# Patient Record
Sex: FEMALE | Race: WHITE | NOT HISPANIC OR LATINO | Employment: OTHER | URBAN - METROPOLITAN AREA
[De-identification: names, ages, dates, MRNs, and addresses within clinical notes are randomized per-mention and may not be internally consistent; named-entity substitution may affect disease eponyms.]

---

## 2019-10-29 ENCOUNTER — LAB VISIT (OUTPATIENT)
Dept: LAB | Facility: HOSPITAL | Age: 79
End: 2019-10-29
Attending: INTERNAL MEDICINE
Payer: MEDICARE

## 2019-10-29 ENCOUNTER — OFFICE VISIT (OUTPATIENT)
Dept: PULMONOLOGY | Facility: CLINIC | Age: 79
End: 2019-10-29
Payer: MEDICARE

## 2019-10-29 VITALS
WEIGHT: 143.75 LBS | RESPIRATION RATE: 19 BRPM | BODY MASS INDEX: 25.47 KG/M2 | HEART RATE: 60 BPM | HEIGHT: 63 IN | DIASTOLIC BLOOD PRESSURE: 80 MMHG | SYSTOLIC BLOOD PRESSURE: 142 MMHG | OXYGEN SATURATION: 96 %

## 2019-10-29 DIAGNOSIS — R06.89 DYSPNEA AND RESPIRATORY ABNORMALITIES: ICD-10-CM

## 2019-10-29 DIAGNOSIS — R06.00 DYSPNEA AND RESPIRATORY ABNORMALITIES: ICD-10-CM

## 2019-10-29 DIAGNOSIS — R06.89 DYSPNEA AND RESPIRATORY ABNORMALITIES: Primary | ICD-10-CM

## 2019-10-29 DIAGNOSIS — R06.00 DYSPNEA AND RESPIRATORY ABNORMALITIES: Primary | ICD-10-CM

## 2019-10-29 LAB
CRP SERPL-MCNC: 16.4 MG/L (ref 0–8.2)
ERYTHROCYTE [SEDIMENTATION RATE] IN BLOOD BY WESTERGREN METHOD: 16 MM/HR (ref 0–20)

## 2019-10-29 PROCEDURE — 99205 OFFICE O/P NEW HI 60 MIN: CPT | Mod: S$GLB,,, | Performed by: INTERNAL MEDICINE

## 2019-10-29 PROCEDURE — 86038 ANTINUCLEAR ANTIBODIES: CPT

## 2019-10-29 PROCEDURE — 86255 FLUORESCENT ANTIBODY SCREEN: CPT | Mod: 91

## 2019-10-29 PROCEDURE — 1101F PR PT FALLS ASSESS DOC 0-1 FALLS W/OUT INJ PAST YR: ICD-10-PCS | Mod: S$GLB,,, | Performed by: INTERNAL MEDICINE

## 2019-10-29 PROCEDURE — 99205 PR OFFICE/OUTPT VISIT, NEW, LEVL V, 60-74 MIN: ICD-10-PCS | Mod: S$GLB,,, | Performed by: INTERNAL MEDICINE

## 2019-10-29 PROCEDURE — 99999 PR PBB SHADOW E&M-NEW PATIENT-LVL III: CPT | Mod: PBBFAC,,, | Performed by: INTERNAL MEDICINE

## 2019-10-29 PROCEDURE — 99999 PR PBB SHADOW E&M-NEW PATIENT-LVL III: ICD-10-PCS | Mod: PBBFAC,,, | Performed by: INTERNAL MEDICINE

## 2019-10-29 PROCEDURE — 1101F PT FALLS ASSESS-DOCD LE1/YR: CPT | Mod: S$GLB,,, | Performed by: INTERNAL MEDICINE

## 2019-10-29 PROCEDURE — 85651 RBC SED RATE NONAUTOMATED: CPT

## 2019-10-29 PROCEDURE — 86140 C-REACTIVE PROTEIN: CPT

## 2019-10-29 RX ORDER — AMLODIPINE BESYLATE 5 MG/1
5 TABLET ORAL
COMMUNITY
Start: 2015-07-24

## 2019-10-29 RX ORDER — CYCLOSPORINE 0.5 MG/ML
EMULSION OPHTHALMIC
COMMUNITY
Start: 2017-03-09

## 2019-10-29 RX ORDER — LEVOTHYROXINE SODIUM 88 UG/1
88 TABLET ORAL
COMMUNITY
Start: 2019-06-14

## 2019-10-29 RX ORDER — UBIDECARENONE 30 MG
30 CAPSULE ORAL
COMMUNITY

## 2019-10-29 RX ORDER — FLECAINIDE ACETATE 50 MG/1
TABLET ORAL
Refills: 3 | COMMUNITY
Start: 2019-10-04

## 2019-10-29 RX ORDER — ASPIRIN 325 MG
325 TABLET ORAL
COMMUNITY

## 2019-10-29 RX ORDER — PREDNISONE 5 MG/1
TABLET ORAL
Refills: 1 | COMMUNITY
Start: 2019-10-13

## 2019-10-29 RX ORDER — ESTRADIOL 0.1 MG/G
CREAM VAGINAL
Refills: 11 | COMMUNITY
Start: 2019-09-29

## 2019-10-29 RX ORDER — METOPROLOL SUCCINATE 50 MG/1
50 TABLET, EXTENDED RELEASE ORAL
COMMUNITY
Start: 2016-08-05

## 2019-10-29 RX ORDER — AMLODIPINE BESYLATE 5 MG/1
TABLET ORAL
Refills: 3 | COMMUNITY
Start: 2019-09-03 | End: 2020-02-17 | Stop reason: SDUPTHER

## 2019-10-29 RX ORDER — ASPIRIN 325 MG
TABLET, DELAYED RELEASE (ENTERIC COATED) ORAL
COMMUNITY

## 2019-10-29 RX ORDER — BUTYROSPERMUM PARKII(SHEA BUTTER), SIMMONDSIA CHINENSIS (JOJOBA) SEED OIL, ALOE BARBADENSIS LEAF EXTRACT .01; 1; 3.5 G/100G; G/100G; G/100G
LIQUID TOPICAL
COMMUNITY

## 2019-10-29 RX ORDER — PENTOSAN POLYSULFATE SODIUM 100 MG/1
CAPSULE, GELATIN COATED ORAL
Refills: 11 | COMMUNITY
Start: 2019-07-29

## 2019-10-29 RX ORDER — ESTRADIOL 1 MG/1
TABLET ORAL
Refills: 1 | COMMUNITY
Start: 2019-07-29

## 2019-10-29 RX ORDER — TRAMADOL HYDROCHLORIDE 50 MG/1
TABLET ORAL
COMMUNITY
Start: 2019-08-10

## 2019-10-29 RX ORDER — CLONAZEPAM 1 MG/1
TABLET ORAL
COMMUNITY
Start: 2019-08-01

## 2019-10-29 RX ORDER — NITROFURANTOIN MACROCRYSTALS 50 MG/1
50 CAPSULE ORAL
COMMUNITY

## 2019-10-29 RX ORDER — SIMETHICONE 125 MG
CAPSULE ORAL DAILY PRN
COMMUNITY

## 2019-10-29 NOTE — LETTER
October 29, 2019      Deonna Chua MD  [address] [facility] [address]           [facility]  [address] [place] [id number]  Phone: [phone]  Fax: [phone]          Patient: Thea Centeno   MR Number: [id number]   YOB: 1940   Date of Visit: 10/29/2019       Dear Dr. Deonna Chua:    Thank you for referring Thea Centeno to me for evaluation. Attached you will find relevant portions of my assessment and plan of care.    If you have questions, please do not hesitate to call me. I look forward to following Thea Centeno along with you.    Sincerely,    Manjeet Thomas MD    Enclosure  CC:  No Recipients    If you would like to receive this communication electronically, please contact [email or web address] or [phone] to request more information on [employer] access.    For providers and/or their staff who would like to refer a patient to [facility], please contact us through our one-stop-shop provider referral line, [facility] , at [phone].    If you feel you have received this communication in error or would no longer like to receive these types of communications, please e-mail [email or web address]

## 2019-10-29 NOTE — PROGRESS NOTES
"New patient    Subjective:      Patient ID: Thea Centeno is a 79 y.o. female.    Patient Active Problem List   Diagnosis    Dyspnea and respiratory abnormalities       Problem list has been reviewed.    she has been referred by Deonna Chua MD for evaluation and management for   Chief Complaint   Patient presents with    Wheezing       Chief Complaint: Wheezing      HPI:    " I have never heard any wheezing", " My friends and doctors tell me I have wheezing".      Patient reports occasional non productive cough and intermittent dyspnea with exertion and denies chest pain or discomfort. Patient denies recent sick contacts.   Patient does not have new pets. Patient does not have a personal or family  history of asthma. Patient does have a history of environmental allergens. Patient has not traveled recently. Patient does not have a history of smoking. Patient has had a previous chest x-ray. Patient has  had a PPD done.  PPD was Negative    Previous pulmonologist: Dr White - Previous spirometry suggestive of mild restrictive lung defect.- Documents suspicion of Asthma after improvement of symptoms with inhaled corticosteroids.     She is not currently y on any Inhaled therapies.       Previous Report Reviewed: historical medical records, office notes and radiology reports     Past Medical History: The following portions of the patient's history were reviewed and updated as appropriate:   She  has a past surgical history that includes bladder lift and Tonsillectomy (N/A).  Her family history is not on file.  She  reports that she has never smoked. She does not have any smokeless tobacco history on file. She reports that she does not drink alcohol or use drugs.  She has a current medication list which includes the following prescription(s): amlodipine, amlodipine, aspirin, biotin, clonazepam, clotrimazole, co-enzyme q-10, cyclosporine, elmiron, estradiol, estradiol, flecainide, levothyroxine, metoprolol " "succinate, multivitamin with minerals, nitrofurantoin, prednisone, saccharomyces boulardii, simethicone, and tramadol.  She is allergic to doxycycline; codeine; methocarbamol; ropinirole; and chlorhexidine gluconate..    Review of Systems   Constitutional: Positive for fatigue. Negative for fever, chills and night sweats.   HENT: Positive for rhinorrhea. Negative for nosebleeds, postnasal drip, sore throat, trouble swallowing, congestion and hearing loss.    Eyes: Negative for redness and itching.   Respiratory: Positive for cough and dyspnea on extertion. Negative for snoring, wheezing and use of rescue inhaler.    Cardiovascular: Positive for palpitations. Negative for chest pain and leg swelling.   Genitourinary: Negative for difficulty urinating and hematuria.   Endocrine: Negative for polydipsia, cold intolerance and heat intolerance.    Musculoskeletal: Positive for arthralgias and back pain.   Skin: Negative for rash.   Gastrointestinal: Positive for acid reflux. Negative for nausea, vomiting, abdominal pain and abdominal distention.        Diarrhea   Neurological: Negative for dizziness, light-headedness and headaches.   Hematological: Excessive bruising.   Psychiatric/Behavioral: The patient is nervous/anxious.    All other systems reviewed and are negative.         Occupational History:  Worked at [employer] for 23 years. She is currently retired.   [employer] occupational exposure to asbestos, silica and petrochemicals.    Avocational Exposures:  none    Pet Exposures:  none      Objective:     Vitals:    10/29/19 1138   BP: (!) 142/80   Pulse: 60   Resp: 19   SpO2: 96%   Weight: 65.2 kg (143 lb 11.8 oz)   Height: 5' 3" (1.6 m)     Body mass index is 25.46 kg/m².    Physical Exam   Constitutional: She is oriented to person, place, and time. She appears well-developed and well-nourished.   HENT:   Head: Normocephalic and atraumatic.   Mallampati 2   Eyes: Pupils are equal, round, and " "reactive to light. EOM are normal.   Neck: Normal range of motion. Neck supple.   11.5"   Cardiovascular: Normal rate and regular rhythm.   Pulmonary/Chest: Effort normal and breath sounds normal. No stridor. She has no wheezes. She has no rales.   Abdominal: Soft. Bowel sounds are normal.   Musculoskeletal: Normal range of motion.   Neurological: She is alert and oriented to person, place, and time.   Skin: Skin is warm and dry. Capillary refill takes less than 2 seconds.   Psychiatric: She has a normal mood and affect.   Nursing note and vitals reviewed.      Personal Diagnostic Review    PFT: 09/12/19: Complete pulmonary function studies were performed. Spirometry reveals a mild restrictive lung defect. Total lung capacity was reduced at 78% of predicted. Diffusion is also mildly reduced.     CXR: 09.05/19: Comparison study 1/17/2017. Stable mild cardiomegaly. Aortictortuosity. No congestion. Minimal scarring peripheral left lower chest. Otherwise, lungs are clear. Thoracic spondylosis. Status post kyphoplasty of a compressed mid thoracic vertebral body.    Assessment /Plan:     Discussed diagnosis, its evaluation, treatment and usual course. All questions answered.    Problem List Items Addressed This Visit        Other    Dyspnea and respiratory abnormalities - Primary    Current Assessment & Plan     Etiology unclear.  Multifactorial etiology suspected.  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  [x] Cardiac illess  []         Sleep disorder    EVALUATION:  []        Complete PFT with bronchodilator.  []        Bronchial challenge with methacholine.   [x]        Stress test, pulmonary.  [x]        PULM - Arterial Blood Gases  []        Chest X Ray  [x]   "      CT scan of chest.   [x]        HAVEN  [x]        Sedimentation rate  [x]        C-reactive protein  [x]        Anti-neutrophilic cytoplasmic antibody          PLAN:  Discussed diagnosis, its evaluation, treatment and usual course.  All questions answered.        Call if shortness of breath worsens, blood in sputum, change in character of cough, development of fever or chills, inability to maintain nutrition and hydration.              Relevant Orders    HAVEN Screen w/Reflex    C-reactive protein    Sedimentation rate    Anti-neutrophilic cytoplasmic antibody    Complete PFT with bronchodilator    PULM - Arterial Blood Gases--in addition to PFT only    Pulmonary stress test    CT Chest Without Contrast              TIME SPENT WITH PATIENT: Time spent: 60 minutes in face to face  discussion concerning diagnosis, prognosis, review of lab and test results, benefits of treatment as well as management of disease, counseling of patient and coordination of care between various health  care providers . Greater than half the time spent was used for coordination of care and counseling of patient.     Follow up in about 6 weeks (around 12/10/2019) for Wheezing..

## 2019-10-29 NOTE — ASSESSMENT & PLAN NOTE
Etiology unclear.  Multifactorial etiology suspected.  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  [x] Cardiac illess  []         Sleep disorder    EVALUATION:  []        Complete PFT with bronchodilator.  []        Bronchial challenge with methacholine.   [x]        Stress test, pulmonary.  [x]        PULM - Arterial Blood Gases  []        Chest X Ray  [x]        CT scan of chest.   [x]        HAVEN  [x]        Sedimentation rate  [x]        C-reactive protein  [x]        Anti-neutrophilic cytoplasmic antibody          PLAN:  Discussed diagnosis, its evaluation, treatment and usual course.  All questions answered.        Call if shortness of breath worsens, blood in sputum, change in character of cough, development of fever or chills, inability to maintain nutrition and hydration.

## 2019-10-29 NOTE — PATIENT INSTRUCTIONS
Lung Anatomy  Your lungs take air in to give your body oxygen, which the body needs to work. Your lungs, like all the tissues in your body, are made up of billions of tiny specialized cells. Old lung cells die and are replaced by new, identical lung cells. This natural process helps ensure healthy lungs.    Date Last Reviewed: 11/1/2016  © 2749-9778 [employer]. [address] [place] [address], [place] [address]. All rights reserved. This information is not intended as a substitute for professional medical care. Always follow your healthcare professional's instructions.

## 2019-10-30 LAB — ANA SER QL IF: NORMAL

## 2019-11-01 LAB
ANCA AB TITR SER IF: NORMAL TITER
P-ANCA TITR SER IF: NORMAL TITER

## 2019-12-10 ENCOUNTER — TELEPHONE (OUTPATIENT)
Dept: PULMONOLOGY | Facility: CLINIC | Age: 79
End: 2019-12-10

## 2019-12-10 NOTE — TELEPHONE ENCOUNTER
----- Message from Mony Centeno sent at 12/10/2019 12:06 PM CST -----  Contact: Pt  Pt asked that nurse contact her regarding her upcoming appt.

## 2020-02-17 ENCOUNTER — OFFICE VISIT (OUTPATIENT)
Dept: PULMONOLOGY | Facility: CLINIC | Age: 80
End: 2020-02-17
Payer: MEDICARE

## 2020-02-17 ENCOUNTER — HOSPITAL ENCOUNTER (OUTPATIENT)
Dept: RADIOLOGY | Facility: HOSPITAL | Age: 80
Discharge: HOME | End: 2020-02-17
Attending: INTERNAL MEDICINE
Payer: MEDICARE

## 2020-02-17 VITALS
BODY MASS INDEX: 25.32 KG/M2 | HEIGHT: 63 IN | HEART RATE: 64 BPM | RESPIRATION RATE: 20 BRPM | WEIGHT: 142.88 LBS | SYSTOLIC BLOOD PRESSURE: 160 MMHG | DIASTOLIC BLOOD PRESSURE: 80 MMHG | OXYGEN SATURATION: 98 %

## 2020-02-17 DIAGNOSIS — R06.00 DYSPNEA AND RESPIRATORY ABNORMALITIES: ICD-10-CM

## 2020-02-17 DIAGNOSIS — R06.00 DYSPNEA AND RESPIRATORY ABNORMALITIES: Primary | Chronic | ICD-10-CM

## 2020-02-17 DIAGNOSIS — R06.89 DYSPNEA AND RESPIRATORY ABNORMALITIES: ICD-10-CM

## 2020-02-17 DIAGNOSIS — R91.8 LUNG NODULE, MULTIPLE: Chronic | ICD-10-CM

## 2020-02-17 DIAGNOSIS — R06.89 DYSPNEA AND RESPIRATORY ABNORMALITIES: Primary | Chronic | ICD-10-CM

## 2020-02-17 PROCEDURE — 1101F PT FALLS ASSESS-DOCD LE1/YR: CPT | Mod: S$GLB,,, | Performed by: INTERNAL MEDICINE

## 2020-02-17 PROCEDURE — 99215 PR OFFICE/OUTPT VISIT, EST, LEVL V, 40-54 MIN: ICD-10-PCS | Mod: S$GLB,,, | Performed by: INTERNAL MEDICINE

## 2020-02-17 PROCEDURE — 1159F MED LIST DOCD IN RCRD: CPT | Mod: S$GLB,,, | Performed by: INTERNAL MEDICINE

## 2020-02-17 PROCEDURE — 1101F PR PT FALLS ASSESS DOC 0-1 FALLS W/OUT INJ PAST YR: ICD-10-PCS | Mod: S$GLB,,, | Performed by: INTERNAL MEDICINE

## 2020-02-17 PROCEDURE — 71250 CT THORAX DX C-: CPT | Mod: TC

## 2020-02-17 PROCEDURE — 99215 OFFICE O/P EST HI 40 MIN: CPT | Mod: S$GLB,,, | Performed by: INTERNAL MEDICINE

## 2020-02-17 PROCEDURE — 99999 PR PBB SHADOW E&M-EST. PATIENT-LVL III: CPT | Mod: PBBFAC,,, | Performed by: INTERNAL MEDICINE

## 2020-02-17 PROCEDURE — 99999 PR PBB SHADOW E&M-EST. PATIENT-LVL III: ICD-10-PCS | Mod: PBBFAC,,, | Performed by: INTERNAL MEDICINE

## 2020-02-17 PROCEDURE — 1159F PR MEDICATION LIST DOCUMENTED IN MEDICAL RECORD: ICD-10-PCS | Mod: S$GLB,,, | Performed by: INTERNAL MEDICINE

## 2020-02-17 NOTE — ASSESSMENT & PLAN NOTE
Repeat CT chest in 12 months    Fleischner Society Guidelines:    High risk patient:   Smoking history, history of malignancy or risk factors for malignancy.( non-solid ground glass opacities and partially solid nodules may require longer follow up to exclude indolent adenocarcinoma)      Nodule size Low risk patient High risk patient   4 mm  No follow up Follow up CT in 12 months. If unchanged, no further follow up.   4 - 6 mm Follow up CT in 12 months; if unchanged, no further follow up.  Initial follow up CT in 6 - 12 months, then at 18 - 24 months if no change.      6 - 8 mm  Initial follow up CT at 6 - 12 months and at 18 months if no change.  Initial follow up CT at 3 - 6 months. Then at 9-12 months and 24 months if no change.      > 8 mm Initial follow up CT at 3, 6, 9 and 24 months, dynamic contrast enhanced CT, PET-CT and/or biopsy. Initial follow up CT at 3, 6, 9 and 24 months, dynamic contrast enhanced CT, PET-CT and/or biopsy.

## 2020-02-17 NOTE — PATIENT INSTRUCTIONS
Lung Anatomy  Your lungs take air in to give your body oxygen, which the body needs to work. Your lungs, like all the tissues in your body, are made up of billions of tiny specialized cells. Old lung cells die and are replaced by new, identical lung cells. This natural process helps ensure healthy lungs.    Date Last Reviewed: 11/1/2016  © 9652-6921 [employer]. [address] [place] [address], [place] [address]. All rights reserved. This information is not intended as a substitute for professional medical care. Always follow your healthcare professional's instructions.

## 2020-02-17 NOTE — PROGRESS NOTES
Subjective:      Established patient    Patient ID: Thea Centeno is a 79 y.o. female.  Patient Active Problem List   Diagnosis    Dyspnea and respiratory abnormalities    Lung nodule, multiple       Problem list has been reviewed.    Chief Complaint: Shortness of Breath (rv ct)      HPI    PFT, 6 MWT and ABG  Not done and will be rescheduled.  CT chest reviewed with patient who expressed and voiced understanding.   All questions were answered to the patients satisfaction.      Patients reports NYHA I dyspnea    The patient does not have currently have symptoms / an exacerbation.         No recent change in breathing.      A full  review of systems, past , family  and social histories was performed except as mentioned in the note above, these are non contributory to the main issues discussed today.       Previous Report Reviewed: lab reports, office notes and radiology reports     The following portions of the patient's history were reviewed and updated as appropriate: She  has a past medical history of Hypertension.  She  has a past surgical history that includes bladder lift and Tonsillectomy (N/A).  Her family history is not on file.  She  reports that she has never smoked. She does not have any smokeless tobacco history on file. She reports that she does not drink alcohol or use drugs.  She has a current medication list which includes the following prescription(s): amlodipine, aspirin, biotin, clonazepam, clotrimazole, co-enzyme q-10, cyclosporine, estradiol, flecainide, levothyroxine, metoprolol succinate, multivitamin with minerals, nitrofurantoin, saccharomyces boulardii, tramadol, elmiron, estradiol, prednisone, and simethicone.  She is allergic to doxycycline; codeine; methocarbamol; ropinirole; and chlorhexidine gluconate..    Review of Systems   Constitutional: Positive for fatigue. Negative for fever, chills and night sweats.   HENT: Positive for rhinorrhea. Negative for nosebleeds, postnasal drip,  "sore throat, trouble swallowing, congestion and hearing loss.    Eyes: Negative for redness and itching.   Respiratory: Positive for dyspnea on extertion. Negative for snoring, cough, wheezing and use of rescue inhaler.    Cardiovascular: Negative for chest pain, palpitations and leg swelling.   Genitourinary: Negative for difficulty urinating and hematuria.   Endocrine: Negative for polydipsia, cold intolerance and heat intolerance.    Musculoskeletal: Positive for arthralgias and back pain.   Skin: Negative for rash.   Gastrointestinal: Positive for acid reflux. Negative for nausea, vomiting, abdominal pain and abdominal distention.        Diarrhea   Neurological: Negative for dizziness, light-headedness and headaches.   Hematological: Excessive bruising.   Psychiatric/Behavioral: The patient is nervous/anxious.    All other systems reviewed and are negative.       Objective:     BP (!) 160/80   Pulse 64   Resp 20   Ht 5' 3" (1.6 m)   Wt 64.8 kg (142 lb 13.7 oz)   SpO2 98%   BMI 25.31 kg/m²   Body mass index is 25.31 kg/m².       Physical Exam   Constitutional: She is oriented to person, place, and time. She appears well-developed and well-nourished.   HENT:   Head: Normocephalic and atraumatic.   Mallampati 2   Eyes: Pupils are equal, round, and reactive to light. EOM are normal.   Neck: Normal range of motion. Neck supple.   11.5"   Cardiovascular: Normal rate and regular rhythm.   Pulmonary/Chest: Effort normal and breath sounds normal. No stridor. She has no wheezes. She has no rales.   Abdominal: Soft. Bowel sounds are normal.   Musculoskeletal: Normal range of motion.   Neurological: She is alert and oriented to person, place, and time.   Skin: Skin is warm and dry. Capillary refill takes less than 2 seconds.   Psychiatric: She has a normal mood and affect.   Nursing note and vitals reviewed.      Personal Diagnostic Review      CT of chest performed on 02/17/20:   1. Nonspecific geographic " ground-glass within the upper lobes bilaterally.  No reticular opacities, architectural distortion, or pattern of pulmonary nodules.  2. Two nonspecific nodules within the left lung with the largest measuring 5 mm. Consider re-evaluation with CT in 12months.  3. Cholelithiasis.  4. Chronic compression deformities of T8 and T10 with prior vertebroplasty of T8.       PFT: 09/12/19: Complete pulmonary function studies were performed. Spirometry reveals a mild restrictive lung defect. Total lung capacity was reduced at 78% of predicted. Diffusion is also mildly reduced.      CXR: 09.05/19: Comparison study 1/17/2017. Stable mild cardiomegaly. Aortictortuosity. No congestion. Minimal scarring peripheral left lower chest. Otherwise, lungs are clear. Thoracic spondylosis. Status post kyphoplasty of a compressed mid thoracic vertebral body.      Laboratory Data:    Cytoplasmic Neutrophilic Ab <1:20 Titer <1:20      Perinuclear (P-ANCA) <1:20 Titer <1:20      Sed Rate 0 - 20 mm/Hr 16      CRP 0.0 - 8.2 mg/L 16.4High      HAVEN Screen Negative <1:160 Negative <1:160              Assessment / Plan:       Discussed diagnosis, its evaluation, treatment and usual course. All questions answered.    Problem List Items Addressed This Visit        Pulmonary    Lung nodule, multiple    Overview     CT CHEST 07/17/20: Nonspecific geographic ground-glass within the upper lobes bilaterally.  No reticular opacities, architectural distortion, or pattern of pulmonary nodules. Two nonspecific nodules within the left lung with the largest measuring 5 mm.         Current Assessment & Plan       Repeat CT chest in 12 months    Fleischner Society Guidelines:    High risk patient:   Smoking history, history of malignancy or risk factors for malignancy.( non-solid ground glass opacities and partially solid nodules may require longer follow up to exclude indolent adenocarcinoma)      Nodule size Low risk patient High risk patient   4 mm  No follow up  Follow up CT in 12 months. If unchanged, no further follow up.   4 - 6 mm Follow up CT in 12 months; if unchanged, no further follow up.  Initial follow up CT in 6 - 12 months, then at 18 - 24 months if no change.      6 - 8 mm  Initial follow up CT at 6 - 12 months and at 18 months if no change.  Initial follow up CT at 3 - 6 months. Then at 9-12 months and 24 months if no change.      > 8 mm Initial follow up CT at 3, 6, 9 and 24 months, dynamic contrast enhanced CT, PET-CT and/or biopsy. Initial follow up CT at 3, 6, 9 and 24 months, dynamic contrast enhanced CT, PET-CT and/or biopsy.               Other    Dyspnea and respiratory abnormalities - Primary    Current Assessment & Plan     Etiology unclear.  Multifactorial etiology suspected.  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  [x] Cardiac illess  []         Sleep disorder    EVALUATION:  [x]        Complete PFT with bronchodilator.  []        Bronchial challenge with methacholine.   [x]        Stress test, pulmonary.  [x]        PULM - Arterial Blood Gases  []        Chest X Ray  [x]        CT scan of chest.   [x]        HAVEN  [x]        Sedimentation rate  [x]        C-reactive protein  [x]        Anti-neutrophilic cytoplasmic antibody          PLAN:  Discussed diagnosis, its evaluation, treatment and usual course.  All questions answered.     PFT, 6MWT, ABG WILL BE RESCHEDULED.        Call if shortness of breath worsens, blood in sputum, change in character of cough, development of fever or chills, inability to maintain nutrition and hydration.                  TIME SPENT WITH PATIENT: Time spent: 40 minutes in face to face  discussion concerning diagnosis, prognosis, review of lab and test results,  benefits of treatment as well as management of disease, counseling of patient and coordination of care between various health  care providers . Greater than half the time spent was used for coordination of care and counseling of patient.       Follow up in about 2 months (around 4/17/2020) for Multiple lung nodules, Shortness of breath.

## 2020-02-17 NOTE — ASSESSMENT & PLAN NOTE
Etiology unclear.  Multifactorial etiology suspected.  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  [x] Cardiac illess  []         Sleep disorder    EVALUATION:  [x]        Complete PFT with bronchodilator.  []        Bronchial challenge with methacholine.   [x]        Stress test, pulmonary.  [x]        PULM - Arterial Blood Gases  []        Chest X Ray  [x]        CT scan of chest.   [x]        HAVEN  [x]        Sedimentation rate  [x]        C-reactive protein  [x]        Anti-neutrophilic cytoplasmic antibody          PLAN:  Discussed diagnosis, its evaluation, treatment and usual course.  All questions answered.     PFT, 6MWT, ABG WILL BE RESCHEDULED.        Call if shortness of breath worsens, blood in sputum, change in character of cough, development of fever or chills, inability to maintain nutrition and hydration.